# Patient Record
Sex: FEMALE | Race: WHITE | NOT HISPANIC OR LATINO | Employment: FULL TIME | URBAN - METROPOLITAN AREA
[De-identification: names, ages, dates, MRNs, and addresses within clinical notes are randomized per-mention and may not be internally consistent; named-entity substitution may affect disease eponyms.]

---

## 2020-10-12 ASSESSMENT — ANXIETY QUESTIONNAIRES
3. WORRYING TOO MUCH ABOUT DIFFERENT THINGS: SEVERAL DAYS
6. BECOMING EASILY ANNOYED OR IRRITABLE: NOT AT ALL
5. BEING SO RESTLESS THAT IT IS HARD TO SIT STILL: NOT AT ALL
4. TROUBLE RELAXING: SEVERAL DAYS
2. NOT BEING ABLE TO STOP OR CONTROL WORRYING: SEVERAL DAYS
7. FEELING AFRAID AS IF SOMETHING AWFUL MIGHT HAPPEN: NOT AT ALL
GAD7 TOTAL SCORE: 4
1. FEELING NERVOUS, ANXIOUS, OR ON EDGE: SEVERAL DAYS

## 2020-10-14 ASSESSMENT — ANXIETY QUESTIONNAIRES: GAD7 TOTAL SCORE: 4

## 2020-10-19 ASSESSMENT — ANXIETY QUESTIONNAIRES
4. TROUBLE RELAXING: SEVERAL DAYS
5. BEING SO RESTLESS THAT IT IS HARD TO SIT STILL: NOT AT ALL
6. BECOMING EASILY ANNOYED OR IRRITABLE: NOT AT ALL
GAD7 TOTAL SCORE: 4
1. FEELING NERVOUS, ANXIOUS, OR ON EDGE: SEVERAL DAYS
7. FEELING AFRAID AS IF SOMETHING AWFUL MIGHT HAPPEN: NOT AT ALL
2. NOT BEING ABLE TO STOP OR CONTROL WORRYING: SEVERAL DAYS
GAD7 TOTAL SCORE: 4
3. WORRYING TOO MUCH ABOUT DIFFERENT THINGS: SEVERAL DAYS
7. FEELING AFRAID AS IF SOMETHING AWFUL MIGHT HAPPEN: NOT AT ALL

## 2020-10-19 ASSESSMENT — ENCOUNTER SYMPTOMS
DEPRESSION: 0
DECREASED CONCENTRATION: 0
NERVOUS/ANXIOUS: 1
PANIC: 0
DECREASED LIBIDO: 0
INSOMNIA: 0
HOT FLASHES: 0

## 2020-10-20 ENCOUNTER — OFFICE VISIT (OUTPATIENT)
Dept: OBGYN | Facility: CLINIC | Age: 20
End: 2020-10-20
Attending: NURSE PRACTITIONER
Payer: COMMERCIAL

## 2020-10-20 VITALS
HEART RATE: 87 BPM | SYSTOLIC BLOOD PRESSURE: 111 MMHG | DIASTOLIC BLOOD PRESSURE: 76 MMHG | WEIGHT: 174.4 LBS | BODY MASS INDEX: 29.78 KG/M2 | HEIGHT: 64 IN

## 2020-10-20 DIAGNOSIS — Z30.09 ENCOUNTER FOR OTHER GENERAL COUNSELING OR ADVICE ON CONTRACEPTION: ICD-10-CM

## 2020-10-20 DIAGNOSIS — Z01.419 ENCOUNTER FOR GYNECOLOGICAL EXAMINATION WITHOUT ABNORMAL FINDING: ICD-10-CM

## 2020-10-20 DIAGNOSIS — Z00.00 VISIT FOR PREVENTIVE HEALTH EXAMINATION: Primary | ICD-10-CM

## 2020-10-20 DIAGNOSIS — Z11.3 SCREEN FOR STD (SEXUALLY TRANSMITTED DISEASE): ICD-10-CM

## 2020-10-20 DIAGNOSIS — F41.1 GENERALIZED ANXIETY DISORDER: ICD-10-CM

## 2020-10-20 DIAGNOSIS — Z12.4 SCREENING FOR MALIGNANT NEOPLASM OF CERVIX: ICD-10-CM

## 2020-10-20 DIAGNOSIS — Z72.51 UNPROTECTED SEXUAL INTERCOURSE: ICD-10-CM

## 2020-10-20 LAB
HCG UR QL: NEGATIVE
HIV 1+2 AB+HIV1 P24 AG SERPL QL IA: NONREACTIVE
INTERNAL QC OK POCT: YES

## 2020-10-20 PROCEDURE — G0463 HOSPITAL OUTPT CLINIC VISIT: HCPCS | Mod: 25

## 2020-10-20 PROCEDURE — 86780 TREPONEMA PALLIDUM: CPT | Performed by: NURSE PRACTITIONER

## 2020-10-20 PROCEDURE — 87491 CHLMYD TRACH DNA AMP PROBE: CPT | Performed by: NURSE PRACTITIONER

## 2020-10-20 PROCEDURE — 99385 PREV VISIT NEW AGE 18-39: CPT | Performed by: NURSE PRACTITIONER

## 2020-10-20 PROCEDURE — 36415 COLL VENOUS BLD VENIPUNCTURE: CPT | Performed by: NURSE PRACTITIONER

## 2020-10-20 PROCEDURE — 87591 N.GONORRHOEAE DNA AMP PROB: CPT | Performed by: NURSE PRACTITIONER

## 2020-10-20 PROCEDURE — 87389 HIV-1 AG W/HIV-1&-2 AB AG IA: CPT | Performed by: NURSE PRACTITIONER

## 2020-10-20 PROCEDURE — 81025 URINE PREGNANCY TEST: CPT | Performed by: NURSE PRACTITIONER

## 2020-10-20 PROCEDURE — G0145 SCR C/V CYTO,THINLAYER,RESCR: HCPCS | Performed by: NURSE PRACTITIONER

## 2020-10-20 RX ORDER — ESCITALOPRAM OXALATE 20 MG/1
20 TABLET ORAL DAILY
Qty: 60 TABLET | Refills: 4 | Status: SHIPPED | OUTPATIENT
Start: 2020-10-20 | End: 2021-08-02

## 2020-10-20 RX ORDER — ESCITALOPRAM OXALATE 20 MG/1
TABLET ORAL
COMMUNITY
Start: 2020-07-22 | End: 2020-10-20

## 2020-10-20 ASSESSMENT — ANXIETY QUESTIONNAIRES
1. FEELING NERVOUS, ANXIOUS, OR ON EDGE: SEVERAL DAYS
3. WORRYING TOO MUCH ABOUT DIFFERENT THINGS: SEVERAL DAYS
7. FEELING AFRAID AS IF SOMETHING AWFUL MIGHT HAPPEN: NOT AT ALL
2. NOT BEING ABLE TO STOP OR CONTROL WORRYING: SEVERAL DAYS
5. BEING SO RESTLESS THAT IT IS HARD TO SIT STILL: NOT AT ALL
6. BECOMING EASILY ANNOYED OR IRRITABLE: NOT AT ALL
GAD7 TOTAL SCORE: 4

## 2020-10-20 ASSESSMENT — PATIENT HEALTH QUESTIONNAIRE - PHQ9: 5. POOR APPETITE OR OVEREATING: SEVERAL DAYS

## 2020-10-20 ASSESSMENT — MIFFLIN-ST. JEOR: SCORE: 1536.07

## 2020-10-20 NOTE — LETTER
10/20/2020       RE: Elena Gunter  [address]  [facility] [address]     Dear Colleague,    Thank you for referring your patient, Elena Gunter, to the [facility] [place] [facility]. Please see a copy of my visit note below.      Progress Note    SUBJECTIVE:  Elena Gunter is a 20 year old, , who requests an Annual Preventive Exam.     Concerns today include:     1. Contraception: Patient took plan B one month ago and has not had her regular menses since. She was supposed to get her period the last week of September. Reports that she did have an episode of very heavy bleeding about 1.5-2 weeks after taking Plan B. Patient is interested in starting birth control today. Currently uses condoms. Has never been on birth control previously.      2. Desires STI testing today- has never been tested. Has had 1 partner in the last 3 months, 2 in last year.     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 Lives in an apartment with several roommates.     Menstrual History: period comes roughly every 28-30, lasts about 4-5 days with moderate bleeding and mild cramping.   Menstrual History 10/20/2020 10/20/2020   LAST MENSTRUAL PERIOD 2020 -   Menarche Age - 7   Period Cycle (Days) - 28   Period Duration (Days) - 4-5   Period Pattern - Regular   Menstrual Flow - Moderate   Menstrual Control - Maxi pad;Tampon   Dysmenorrhea - Moderate   PMS Symptoms - Cramping   Reviewed Today - Yes       First pap smear due today.    Diet: vegetarian. Gets adequate protein, fruits and veggies daily. Gets good dairy/calcium intake as well.     Exercise: 1-2x per week walking/running.     Mental health: taking lexapro since July and liking this method. Reports good results and feeling well since starting. No concerns for side effects reported.     HISTORY:  No current outpatient medications on file prior  to visit.  No current facility-administered medications on file prior to visit.     No Known Allergies    There is no immunization history on file for this patient.  Patient reports receiving the HPV vaccine series previously. Has already received the flu vaccine this year.     OB History    Para Term  AB Living   0 0 0 0 0 0   SAB TAB Ectopic Multiple Live Births   0 0 0 0 0     Past Medical History:   Diagnosis Date     Anxiety      Depression      Past Surgical History:   Procedure Laterality Date     wisdom teeth       Family History   Problem Relation Age of Onset     Anxiety Disorder Mother      Mental Illness Mother      Depression Mother      Social History     Socioeconomic History     Marital status: Single     Spouse name: None     Number of children: None     Years of education: None     Highest education level: None   Occupational History     None   Social Needs     Financial resource strain: None     Food insecurity     Worry: None     Inability: None     Transportation needs     Medical: None     Non-medical: None   Tobacco Use     Smoking status: Never Smoker     Smokeless tobacco: Never Used   Substance and Sexual Activity     Alcohol use: Yes     Comment: Very little     Drug use: Never     Sexual activity: Yes     Partners: Male     Birth control/protection: None   Lifestyle     Physical activity     Days per week: None     Minutes per session: None     Stress: None   Relationships     Social connections     Talks on phone: None     Gets together: None     Attends Adventist service: None     Active member of club or organization: None     Attends meetings of clubs or organizations: None     Relationship status: None     Intimate partner violence     Fear of current or ex partner: None     Emotionally abused: None     Physically abused: None     Forced sexual activity: None   Other Topics Concern     None   Social History Narrative     None       ROS   ROS: 10 point ROS neg other than  "the symptoms noted above in the HPI.  PHQ-2 Score:     PHQ-2 ( 1999 Pfizer) 10/20/2020   Q1: Little interest or pleasure in doing things 0   Q2: Feeling down, depressed or hopeless 0   PHQ-2 Score 0       MERCEDES-7 SCORE 10/12/2020 10/19/2020 10/20/2020   Total Score 4 (minimal anxiety) 4 (minimal anxiety) -   Total Score 4 4 4         EXAM:  Blood pressure 111/76, pulse 87, height 1.626 m (5' 4\"), weight 79.1 kg (174 lb 6.4 oz), last menstrual period 09/01/2020, not currently breastfeeding. Body mass index is 29.94 kg/m .  General - pleasant female in no acute distress.  Skin - no suspicious lesions or rashes  EENT-  euthyroid with out palpable nodules  Neck - supple without lymphadenopathy.  Lungs - clear to auscultation bilaterally.  Heart - regular rate and rhythm without murmur.  Abdomen - soft, nontender, nondistended, no masses or organomegaly noted.  Musculoskeletal - no gross deformities.  Neurological - normal strength, sensation, and mental status.    Breast Exam:  Breast: Without visible skin changes. No dimpling or lesions seen.   Breasts supple, non-tender with palpation, no dominant mass, nodularity, or nipple discharge noted bilaterally. Axillary nodes negative.      Pelvic Exam:  EG/BUS: Normal genital architecture without lesions, erythema or abnormal secretions Bartholin's, Urethra, [place]'s normal   Urethral meatus: normal   Urethra: no masses, tenderness, or scarring   Bladder: no masses or tenderness   Vagina: moist, pink, rugae with creamy, white, odorless and physiologic discharge  secretions  Cervix: Nulliparous, no lesions and pink, moist, closed, without lesion or CMT  Uterus: anteverted,   Adnexa: Within normal limits and No masses, nodularity, tenderness  Rectum: anus normal     UPT: negative    ASSESSMENT:  Encounter Diagnoses   Name Primary?     Screen for STD (sexually transmitted disease)      Unprotected sexual intercourse      Generalized anxiety disorder      Visit for preventive " health examination Yes     Screening for malignant neoplasm of cervix      Encounter for gynecological examination without abnormal finding      Encounter for other general counseling or advice on contraception         PLAN:   Orders Placed This Encounter   Procedures     Pelvic and Breast Exam Procedure []     Pap Smear Exam [] Do Not Remove     HIV Antigen Antibody Combo     Treponema Abs w Reflex to RPR and Titer     Pap imaged thin layer screen only - recommended age 21 - 24 years     hCG qual urine POCT     Refill provided for Lexapro.   STD testing completed today  First pap smear completed today   Counseled on contraceptive options. Pt desires to have an IUD placed. Counseling done on type sof IUDs, efficacy, common bleeding patterns, and side effects. Counseled on importance of consistent condom use for the 2 weeks before IUD placement. Take 600mg Ibuprofen 30 minutes prior to procedure. Pt will schedule IUD placement at her convenience.   Additional teaching done at this visit regarding calcium (1200 mg per day), self breast exam, exercise, birth control, mental health and weight/diet.    Return to clinic in one year.  Follow-up as needed.  Syl Rankin, DNP, APRN, WHNP

## 2020-10-20 NOTE — PATIENT INSTRUCTIONS

## 2020-10-20 NOTE — PROGRESS NOTES
Progress Note    SUBJECTIVE:  Elena Gunter is a 20 year old, , who requests an Annual Preventive Exam.     Concerns today include:     1. Contraception: Patient took plan B one month ago and has not had her regular menses since. She was supposed to get her period the last week of September. Reports that she did have an episode of very heavy bleeding about 1.5-2 weeks after taking Plan B. Patient is interested in starting birth control today. Currently uses condoms. Has never been on birth control previously.      2. Desires STI testing today- has never been tested. Has had 1 partner in the last 3 months, 2 in last year.     Social History: She is here from [place] studying at the [employer] in [employer] school for human rights. Lives in an apartment with several roommates.     Menstrual History: period comes roughly every 28-30, lasts about 4-5 days with moderate bleeding and mild cramping.   Menstrual History 10/20/2020 10/20/2020   LAST MENSTRUAL PERIOD 2020 -   Menarche Age - 7   Period Cycle (Days) - 28   Period Duration (Days) - 4-5   Period Pattern - Regular   Menstrual Flow - Moderate   Menstrual Control - Maxi pad;Tampon   Dysmenorrhea - Moderate   PMS Symptoms - Cramping   Reviewed Today - Yes       First pap smear due today.    Diet: vegetarian. Gets adequate protein, fruits and veggies daily. Gets good dairy/calcium intake as well.     Exercise: 1-2x per week walking/running.     Mental health: taking lexapro since July and liking this method. Reports good results and feeling well since starting. No concerns for side effects reported.     HISTORY:  No current outpatient medications on file prior to visit.  No current facility-administered medications on file prior to visit.     No Known Allergies    There is no immunization history on file for this patient.  Patient reports receiving the HPV vaccine series previously. Has already received the flu vaccine this year.     OB History    Para Term   AB Living   0 0 0 0 0 0   SAB TAB Ectopic Multiple Live Births   0 0 0 0 0     Past Medical History:   Diagnosis Date     Anxiety      Depression      Past Surgical History:   Procedure Laterality Date     wisdom teeth       Family History   Problem Relation Age of Onset     Anxiety Disorder Mother      Mental Illness Mother      Depression Mother      Social History     Socioeconomic History     Marital status: Single     Spouse name: None     Number of children: None     Years of education: None     Highest education level: None   Occupational History     None   Social Needs     Financial resource strain: None     Food insecurity     Worry: None     Inability: None     Transportation needs     Medical: None     Non-medical: None   Tobacco Use     Smoking status: Never Smoker     Smokeless tobacco: Never Used   Substance and Sexual Activity     Alcohol use: Yes     Comment: Very little     Drug use: Never     Sexual activity: Yes     Partners: Male     Birth control/protection: None   Lifestyle     Physical activity     Days per week: None     Minutes per session: None     Stress: None   Relationships     Social connections     Talks on phone: None     Gets together: None     Attends Jewish service: None     Active member of club or organization: None     Attends meetings of clubs or organizations: None     Relationship status: None     Intimate partner violence     Fear of current or ex partner: None     Emotionally abused: None     Physically abused: None     Forced sexual activity: None   Other Topics Concern     None   Social History Narrative     None       ROS   ROS: 10 point ROS neg other than the symptoms noted above in the HPI.  PHQ-2 Score:     PHQ-2 (  Pfizer) 10/20/2020   Q1: Little interest or pleasure in doing things 0   Q2: Feeling down, depressed or hopeless 0   PHQ-2 Score 0       MERCEDES-7 SCORE 10/12/2020 10/19/2020 10/20/2020   Total Score 4 (minimal anxiety) 4 (minimal anxiety) -  "  Total Score 4 4 4         EXAM:  Blood pressure 111/76, pulse 87, height 1.626 m (5' 4\"), weight 79.1 kg (174 lb 6.4 oz), last menstrual period 09/01/2020, not currently breastfeeding. Body mass index is 29.94 kg/m .  General - pleasant female in no acute distress.  Skin - no suspicious lesions or rashes  EENT-  euthyroid with out palpable nodules  Neck - supple without lymphadenopathy.  Lungs - clear to auscultation bilaterally.  Heart - regular rate and rhythm without murmur.  Abdomen - soft, nontender, nondistended, no masses or organomegaly noted.  Musculoskeletal - no gross deformities.  Neurological - normal strength, sensation, and mental status.    Breast Exam:  Breast: Without visible skin changes. No dimpling or lesions seen.   Breasts supple, non-tender with palpation, no dominant mass, nodularity, or nipple discharge noted bilaterally. Axillary nodes negative.      Pelvic Exam:  EG/BUS: Normal genital architecture without lesions, erythema or abnormal secretions Bartholin's, Urethra, [place]'s normal   Urethral meatus: normal   Urethra: no masses, tenderness, or scarring   Bladder: no masses or tenderness   Vagina: moist, pink, rugae with creamy, white, odorless and physiologic discharge  secretions  Cervix: Nulliparous, no lesions and pink, moist, closed, without lesion or CMT  Uterus: anteverted,   Adnexa: Within normal limits and No masses, nodularity, tenderness  Rectum: anus normal     UPT: negative    ASSESSMENT:  Encounter Diagnoses   Name Primary?     Screen for STD (sexually transmitted disease)      Unprotected sexual intercourse      Generalized anxiety disorder      Visit for preventive health examination Yes     Screening for malignant neoplasm of cervix      Encounter for gynecological examination without abnormal finding      Encounter for other general counseling or advice on contraception         PLAN:   Orders Placed This Encounter   Procedures     Pelvic and Breast Exam Procedure " []     Pap Smear Exam [] Do Not Remove     HIV Antigen Antibody Combo     Treponema Abs w Reflex to RPR and Titer     Pap imaged thin layer screen only - recommended age 21 - 24 years     hCG qual urine POCT     Refill provided for Lexapro.   STD testing completed today  First pap smear completed today   Counseled on contraceptive options. Pt desires to have an IUD placed. Counseling done on type sof IUDs, efficacy, common bleeding patterns, and side effects. Counseled on importance of consistent condom use for the 2 weeks before IUD placement. Take 600mg Ibuprofen 30 minutes prior to procedure. Pt will schedule IUD placement at her convenience.   Additional teaching done at this visit regarding calcium (1200 mg per day), self breast exam, exercise, birth control, mental health and weight/diet.    Return to clinic in one year.  Follow-up as needed.  Syl Rankin, DNP, APRN, WHNP

## 2020-10-21 LAB
C TRACH DNA SPEC QL NAA+PROBE: NEGATIVE
N GONORRHOEA DNA SPEC QL NAA+PROBE: NEGATIVE
SPECIMEN SOURCE: NORMAL
SPECIMEN SOURCE: NORMAL
T PALLIDUM AB SER QL: NONREACTIVE

## 2020-10-22 LAB
COPATH REPORT: NORMAL
PAP: NORMAL

## 2021-01-09 ENCOUNTER — HEALTH MAINTENANCE LETTER (OUTPATIENT)
Age: 21
End: 2021-01-09

## 2021-02-24 ENCOUNTER — VIRTUAL VISIT (OUTPATIENT)
Dept: FAMILY MEDICINE | Facility: CLINIC | Age: 21
End: 2021-02-24
Payer: COMMERCIAL

## 2021-02-24 DIAGNOSIS — B34.9 VIRAL SYNDROME: Primary | ICD-10-CM

## 2021-02-24 PROCEDURE — 99202 OFFICE O/P NEW SF 15 MIN: CPT | Mod: 95 | Performed by: PHYSICIAN ASSISTANT

## 2021-02-24 NOTE — PROGRESS NOTES
"Elena is a 20 year old who is being evaluated via a billable video visit.      How would you like to obtain your AVS? MyChart  If the video visit is dropped, the invitation should be resent by: Text to cell phone: [phone]  Will anyone else be joining your video visit? No    Video Start Time: 1:15 PM    Assessment & Plan       ICD-10-CM    1. Viral syndrome  B34.9                   BMI:   Estimated body mass index is 29.94 kg/m  as calculated from the following:    Height as of 10/20/20: 1.626 m (5' 4\").    Weight as of 10/20/20: 79.1 kg (174 lb 6.4 oz).       Patient Instructions   I would recommend telling your family to reschedule their flight until at least 10 days after your symptoms began, as long as your improving and fever free without fever reducing medication.   You may still have a variant of COVID that is not picked up by our current COVID tests  Return to clinic for any new or worsening symptoms or go to ER Urgent care in off hours    Discharge Instructions for COVID-19 Patients  You have--or may have--COVID-19. Please follow the instructions listed below.   If you have a weakened immune system, discuss with your doctor any other actions you need to take.  How can I protect others?  If you have symptoms (fever, cough, body aches or trouble breathing):    Stay home and away from others (self-isolate) until:  ? Your other symptoms have resolved (gotten better). And   ? You've had no fever--and no medicine that reduces fever--for 1 full day (24 hours). And   ? At least 10 days have passed since your symptoms started. (You may need to wait 20 days. Follow the advice of your care team.)  If you don't show symptoms, but testing showed that you have COVID-19:    Stay home and away from others (self-isolate) until at least 10 days have passed since the date of your first positive COVID-19 test.  During this time    Stay in your own room, even for meals. Use your own bathroom if you can.    Stay away from " "others in your home. No hugging, kissing or shaking hands. No visitors.    Don't go to work, school or anywhere else.    Clean \"high touch\" surfaces often (doorknobs, counters, handles). Use household cleaning spray or wipes.    You'll find a full list of  on the EPA website: www.epa.gov/pesticide-registration/list-n-disinfectants-use-against-sars-cov-2.    Cover your mouth and nose with a mask or other face covering to avoid spreading germs.    Wash your hands and face often. Use soap and water.    Caregivers in these groups are at risk for severe illness due to COVID-19:  ? People 65 years and older  ? People who live in a nursing home or long-term care facility  ? People with chronic disease (lung, heart, cancer, diabetes, kidney, liver, immunologic)  ? People who have a weakened immune system, including those who:    Are in cancer treatment    Take medicine that weakens the immune system, such as corticosteroids    Had a bone marrow or organ transplant    Have an immune deficiency    Have poorly controlled HIV or AIDS    Are obese (body mass index of 40 or higher)    Smoke regularly    Caregivers should wear gloves while washing dishes, handling laundry and cleaning bedrooms and bathrooms.    Use caution when washing and drying laundry: Don't shake dirty laundry and use the warmest water setting that you can.    For more tips on managing your health at home, go to www.cdc.gov/coronavirus/2019-ncov/downloads/10Things.pdf.  How can I take care of myself at home?  1. Get lots of rest. Drink extra fluids (unless a doctor has told you not to).  2. Take Tylenol (acetaminophen) for fever or pain. If you have liver or kidney problems, ask your family doctor if it's okay to take Tylenol.   Adults can take either:   ? 650 mg (two 325 mg pills) every 4 to 6 hours, or   ? 1,000 mg (two 500 mg pills) every 8 hours as needed.  ? Note: Don't take more than 3,000 mg in one day. Acetaminophen is found in many medicines " (both prescribed and over-the-counter medicines). Read all labels to be sure you don't take too much.   For children, check the Tylenol bottle for the right dose. The dose is based on the child's age or weight.  3. If you have other health problems (like cancer, heart failure, an organ transplant or severe kidney disease): Call your specialty clinic if you don't feel better in the next 2 days.  4. Know when to call 911. Emergency warning signs include:  ? Trouble breathing or shortness of breath  ? Pain or pressure in the chest that doesn't go away  ? Feeling confused like you haven't felt before, or not being able to wake up  ? Bluish-colored lips or face  5. Your doctor may have prescribed a blood thinner medicine. Follow their instructions.  Where can I get more information?    [facility] - About COVID-19:   [email or web address]    CDC - What to Do If You're Sick: www.cdc.gov/coronavirus/2019-ncov/about/steps-when-sick.html    CDC - Ending Home Isolation: www.cdc.gov/coronavirus/2019-ncov/hcp/disposition-in-home-patients.html    CDC - Caring for Someone: www.cdc.gov/coronavirus/2019-ncov/if-you-are-sick/care-for-someone.html    [facility] - Interim Guidance for Hospital Discharge to Home: www.health.[facility]/diseases/coronavirus/hcp/hospdischarge.pdf    Below are the COVID-19 hotlines at the [place] Department of Health ([facility]). Interpreters are available.  ? For health questions: Call 006-871-0730 or 1-234.552.1819 (7 a.m. to 7 p.m.)  ? For questions about schools and childcare: Call 906-258-6463 or 1-599.227.4159 (7 a.m. to 7 p.m.)    For informational purposes only. Not to replace the advice of your health care provider. Clinically reviewed by Dr. Kb Llanes.   Copyright   2020 [employer]. All rights reserved. [employer] [id number] - REV 01/05/21.          No follow-ups on file.    [facility]    Dolly   Elena is a 20  year old who presents for the following health issues     HPI       Acute Illness  Acute illness concerns: Cough, fever-COVID test negative  Onset/Duration: 3 days  Symptoms:  Fever: YES-last day yesterday  Chills/Sweats: YES-last day yesterday  Headache (location?): no  Sinus Pressure: YES  Conjunctivitis:  no  Ear Pain: no  Rhinorrhea: YES  Congestion: YES  Sore Throat: YES  Cough: YES-productive of clear sputum, worsening over time  Wheeze: no  Decreased Appetite: no  Nausea: no  Vomiting: no  Diarrhea: no  Dysuria/Freq.: no  Dysuria or Hematuria: no  Fatigue/Achiness: YES  Sick/Strep Exposure: no  Therapies tried and outcome: Advil, mucinex-helped    3 days ago felt a sore throat  The next day she felt more under the weather and got a COVID test  Developed a cough and a fever up to 103  Now down to 98 degrees  Still has a cough and sore throat, runny nose    Review of Systems   INTEGUMENTARY/SKIN: NEGATIVE for worrisome rashes, moles or lesions  EYES: NEGATIVE for vision changes or irritation  ENT/MOUTH: NEGATIVE for ear pain , rhinorrhea-purulent and sinus pressure  RESP:NEGATIVE for cough-productive, pleurisy and wheezing  CV: NEGATIVE for chest pain, palpitations or peripheral edema  GI: NEGATIVE for nausea, abdominal pain, heartburn, or change in bowel habits  : NEGATIVE for frequency, dysuria, or hematuria  MUSCULOSKELETAL: NEGATIVE for significant arthralgias or myalgia  NEURO: NEGATIVE for weakness, dizziness or paresthesias  ENDOCRINE: NEGATIVE for temperature intolerance, skin/hair changes  HEME: NEGATIVE for bleeding problems  PSYCHIATRIC: NEGATIVE for changes in mood or affect      Objective           Vitals:  No vitals were obtained today due to virtual visit.    Physical Exam   GENERAL: Healthy, alert and no distress  EYES: Eyes grossly normal to inspection.  No discharge or erythema, or obvious scleral/conjunctival abnormalities.  RESP: No audible wheeze, cough, or visible cyanosis.  No visible  retractions or increased work of breathing.    SKIN: Visible skin clear. No significant rash, abnormal pigmentation or lesions.  NEURO: Cranial nerves grossly intact.  Mentation and speech appropriate for age.  PSYCH: Mentation appears normal, affect normal/bright, judgement and insight intact, normal speech and appearance well-groomed.                Video-Visit Details    Type of service:  Video Visit    Video End Time:1:25 PM    Originating Location (pt. Location): Home    Distant Location (provider location):  [facility]     Platform used for Video Visit: Karoline

## 2021-02-24 NOTE — PATIENT INSTRUCTIONS
"I would recommend telling your family to reschedule their flight until at least 10 days after your symptoms began, as long as your improving and fever free without fever reducing medication.   You may still have a variant of COVID that is not picked up by our current COVID tests  Return to clinic for any new or worsening symptoms or go to ER Urgent care in off hours    Discharge Instructions for COVID-19 Patients  You have--or may have--COVID-19. Please follow the instructions listed below.   If you have a weakened immune system, discuss with your doctor any other actions you need to take.  How can I protect others?  If you have symptoms (fever, cough, body aches or trouble breathing):    Stay home and away from others (self-isolate) until:  ? Your other symptoms have resolved (gotten better). And   ? You've had no fever--and no medicine that reduces fever--for 1 full day (24 hours). And   ? At least 10 days have passed since your symptoms started. (You may need to wait 20 days. Follow the advice of your care team.)  If you don't show symptoms, but testing showed that you have COVID-19:    Stay home and away from others (self-isolate) until at least 10 days have passed since the date of your first positive COVID-19 test.  During this time    Stay in your own room, even for meals. Use your own bathroom if you can.    Stay away from others in your home. No hugging, kissing or shaking hands. No visitors.    Don't go to work, school or anywhere else.    Clean \"high touch\" surfaces often (doorknobs, counters, handles). Use household cleaning spray or wipes.    You'll find a full list of  on the EPA website: www.epa.gov/pesticide-registration/list-n-disinfectants-use-against-sars-cov-2.    Cover your mouth and nose with a mask or other face covering to avoid spreading germs.    Wash your hands and face often. Use soap and water.    Caregivers in these groups are at risk for severe illness due to " COVID-19:  ? People 65 years and older  ? People who live in a nursing home or long-term care facility  ? People with chronic disease (lung, heart, cancer, diabetes, kidney, liver, immunologic)  ? People who have a weakened immune system, including those who:    Are in cancer treatment    Take medicine that weakens the immune system, such as corticosteroids    Had a bone marrow or organ transplant    Have an immune deficiency    Have poorly controlled HIV or AIDS    Are obese (body mass index of 40 or higher)    Smoke regularly    Caregivers should wear gloves while washing dishes, handling laundry and cleaning bedrooms and bathrooms.    Use caution when washing and drying laundry: Don't shake dirty laundry and use the warmest water setting that you can.    For more tips on managing your health at home, go to www.cdc.gov/coronavirus/2019-ncov/downloads/10Things.pdf.  How can I take care of myself at home?  1. Get lots of rest. Drink extra fluids (unless a doctor has told you not to).  2. Take Tylenol (acetaminophen) for fever or pain. If you have liver or kidney problems, ask your family doctor if it's okay to take Tylenol.   Adults can take either:   ? 650 mg (two 325 mg pills) every 4 to 6 hours, or   ? 1,000 mg (two 500 mg pills) every 8 hours as needed.  ? Note: Don't take more than 3,000 mg in one day. Acetaminophen is found in many medicines (both prescribed and over-the-counter medicines). Read all labels to be sure you don't take too much.   For children, check the Tylenol bottle for the right dose. The dose is based on the child's age or weight.  3. If you have other health problems (like cancer, heart failure, an organ transplant or severe kidney disease): Call your specialty clinic if you don't feel better in the next 2 days.  4. Know when to call 911. Emergency warning signs include:  ? Trouble breathing or shortness of breath  ? Pain or pressure in the chest that doesn't go away  ? Feeling confused like  you haven't felt before, or not being able to wake up  ? Bluish-colored lips or face  5. Your doctor may have prescribed a blood thinner medicine. Follow their instructions.  Where can I get more information?    [facility] - About COVID-19:   [email or web address]    CDC - What to Do If You're Sick: www.cdc.gov/coronavirus/2019-ncov/about/steps-when-sick.html    CDC - Ending Home Isolation: www.cdc.gov/coronavirus/2019-ncov/hcp/disposition-in-home-patients.html    [facility] - Caring for Someone: www.cdc.gov/coronavirus/2019-ncov/if-you-are-sick/care-for-someone.html    [facility] - Interim Guidance for Hospital Discharge to Home: www.health.[facility].[place]/diseases/coronavirus/hcp/hospdischarge.pdf    Below are the COVID-19 hotlines at the [place] Department of Health ([facility]). Interpreters are available.  ? For health questions: Call 988-975-4467 or 1-338.898.6157 (7 a.m. to 7 p.m.)  ? For questions about schools and childcare: Call 179-048-5337 or 1-928.553.5103 (7 a.m. to 7 p.m.)    For informational purposes only. Not to replace the advice of your health care provider. Clinically reviewed by Dr. Kb Llanes.   Copyright   2020 [facility]. All rights reserved. [employer] [id number] - REV 01/05/21.

## 2021-08-02 DIAGNOSIS — F41.1 GENERALIZED ANXIETY DISORDER: ICD-10-CM

## 2021-08-02 RX ORDER — ESCITALOPRAM OXALATE 20 MG/1
20 TABLET ORAL DAILY
Qty: 90 TABLET | Refills: 0 | Status: SHIPPED | OUTPATIENT
Start: 2021-08-02 | End: 2021-11-08

## 2021-08-02 NOTE — TELEPHONE ENCOUNTER
Refill request received for escitalopram. Last annual exam 10/2020. Short term refill sent. [facility] message sent to patient instructing her to schedule annual exam for further refills.

## 2021-09-03 ENCOUNTER — VIRTUAL VISIT (OUTPATIENT)
Dept: INTERNAL MEDICINE | Facility: CLINIC | Age: 21
End: 2021-09-03
Payer: COMMERCIAL

## 2021-09-03 DIAGNOSIS — F41.0 PANIC DISORDER WITHOUT AGORAPHOBIA: Primary | ICD-10-CM

## 2021-09-03 PROCEDURE — 99213 OFFICE O/P EST LOW 20 MIN: CPT | Mod: 95 | Performed by: INTERNAL MEDICINE

## 2021-09-03 RX ORDER — ESCITALOPRAM OXALATE 10 MG/1
10 TABLET ORAL DAILY
Qty: 90 TABLET | Refills: 3 | Status: SHIPPED | OUTPATIENT
Start: 2021-09-03 | End: 2021-09-13

## 2021-09-03 ASSESSMENT — ANXIETY QUESTIONNAIRES
GAD7 TOTAL SCORE: 6
5. BEING SO RESTLESS THAT IT IS HARD TO SIT STILL: SEVERAL DAYS
GAD7 TOTAL SCORE: 6
3. WORRYING TOO MUCH ABOUT DIFFERENT THINGS: SEVERAL DAYS
6. BECOMING EASILY ANNOYED OR IRRITABLE: SEVERAL DAYS
1. FEELING NERVOUS, ANXIOUS, OR ON EDGE: SEVERAL DAYS
2. NOT BEING ABLE TO STOP OR CONTROL WORRYING: SEVERAL DAYS
4. TROUBLE RELAXING: SEVERAL DAYS
8. IF YOU CHECKED OFF ANY PROBLEMS, HOW DIFFICULT HAVE THESE MADE IT FOR YOU TO DO YOUR WORK, TAKE CARE OF THINGS AT HOME, OR GET ALONG WITH OTHER PEOPLE?: SOMEWHAT DIFFICULT
7. FEELING AFRAID AS IF SOMETHING AWFUL MIGHT HAPPEN: NOT AT ALL
7. FEELING AFRAID AS IF SOMETHING AWFUL MIGHT HAPPEN: NOT AT ALL
GAD7 TOTAL SCORE: 6

## 2021-09-03 NOTE — PROGRESS NOTES
"Elena is a 20 year old who is being evaluated via a billable video visit.      How would you like to obtain your AVS? HCDC  If the video visit is dropped, the invitation should be resent by: Text to cell phone: [phone]  Will anyone else be joining your video visit? No    Video Start Time: 3:33 PM    Assessment & Plan     Panic disorder without agoraphobia  I do believe she would not benefit from a change in SSRI or additional medication like BuSpar.  She does not want to do that at this stage would like to increase her existing citalopram.  I discouraged her to about 40 mg will increase by 10 mg to 30 mg did explain that that is typically the maximum dose for panic anxiety.  She will contact me via HCDC to see if this is helped.  She can add 10 mg to her existing 20 mg prescription  - escitalopram (LEXAPRO) 10 MG tablet  Dispense: 90 tablet; Refill: 3               BMI:   Estimated body mass index is 29.94 kg/m  as calculated from the following:    Height as of 10/20/20: 1.626 m (5' 4\").    Weight as of 10/20/20: 79.1 kg (174 lb 6.4 oz).           No follow-ups on file.    Justine Valdivia MD  [facility]    Subjective   Elena is a 20 year old who presents for the following health issues   Has been on lexapro for a year . Doesn't have a psychaitrist in [facility] . Was prescribed escitalopram 10 mg by a psychiatrist in [place] that was recently increased to 20 mg.  Formal diagnosis of major depressive disorder was made in July 2020.  She also has panic symptoms and had a panic attack 2 weeks ago.  She has no suicidal ideation or psychomotor symptoms does not use any recreational substances and is a non-smoker and social drinker of alcohol   She does have a therapist she is a  and temporarily till May she has some friends here but no family.  She lives alone    Non panic attack   Here fro graduate school . Yes friends   No family     HPI     Pt want to increase " the dose on escitalopram 20 mg.        Review of Systems   Constitutional, HEENT, cardiovascular, pulmonary, gi and gu systems are negative, except as otherwise noted.      Objective           Vitals:  No vitals were obtained today due to virtual visit.    Physical Exam   GENERAL: Healthy, alert and no distress  EYES: Eyes grossly normal to inspection.  No discharge or erythema, or obvious scleral/conjunctival abnormalities.  RESP: No audible wheeze, cough, or visible cyanosis.  No visible retractions or increased work of breathing.    SKIN: Visible skin clear. No significant rash, abnormal pigmentation or lesions.  NEURO: Cranial nerves grossly intact.  Mentation and speech appropriate for age.  PSYCH: Mentation appears normal, affect normal/bright, judgement and insight intact, normal speech and appearance well-groomed.                Video-Visit Details    Type of service:  Video Visit    Video End Time:350 pm     Originating Location (pt. Location): Home    Distant Location (provider location):  [facility]     Platform used for Video Visit: [employer]  Answers for HPI/ROS submitted by the patient on 9/3/2021  MERCEDES 7 TOTAL SCORE: 6

## 2021-09-04 ASSESSMENT — ANXIETY QUESTIONNAIRES: GAD7 TOTAL SCORE: 6

## 2021-09-13 ENCOUNTER — OFFICE VISIT (OUTPATIENT)
Dept: OBGYN | Facility: CLINIC | Age: 21
End: 2021-09-13
Attending: ADVANCED PRACTICE MIDWIFE
Payer: COMMERCIAL

## 2021-09-13 ENCOUNTER — LAB (OUTPATIENT)
Dept: LAB | Facility: CLINIC | Age: 21
End: 2021-09-13
Attending: ADVANCED PRACTICE MIDWIFE
Payer: COMMERCIAL

## 2021-09-13 VITALS
SYSTOLIC BLOOD PRESSURE: 111 MMHG | DIASTOLIC BLOOD PRESSURE: 72 MMHG | WEIGHT: 187.4 LBS | HEART RATE: 83 BPM | BODY MASS INDEX: 32.17 KG/M2

## 2021-09-13 DIAGNOSIS — N76.0 VAGINITIS AND VULVOVAGINITIS: Primary | ICD-10-CM

## 2021-09-13 DIAGNOSIS — Z11.3 SCREEN FOR STD (SEXUALLY TRANSMITTED DISEASE): ICD-10-CM

## 2021-09-13 DIAGNOSIS — N76.0 VAGINITIS AND VULVOVAGINITIS: ICD-10-CM

## 2021-09-13 LAB
HBV SURFACE AG SERPL QL IA: NONREACTIVE
HCV AB SERPL QL IA: NONREACTIVE
HIV 1+2 AB+HIV1 P24 AG SERPL QL IA: NONREACTIVE
T PALLIDUM AB SER QL: NONREACTIVE

## 2021-09-13 PROCEDURE — 86780 TREPONEMA PALLIDUM: CPT

## 2021-09-13 PROCEDURE — 87591 N.GONORRHOEAE DNA AMP PROB: CPT | Performed by: ADVANCED PRACTICE MIDWIFE

## 2021-09-13 PROCEDURE — 86803 HEPATITIS C AB TEST: CPT

## 2021-09-13 PROCEDURE — G0463 HOSPITAL OUTPT CLINIC VISIT: HCPCS

## 2021-09-13 PROCEDURE — 87491 CHLMYD TRACH DNA AMP PROBE: CPT | Performed by: ADVANCED PRACTICE MIDWIFE

## 2021-09-13 PROCEDURE — 87389 HIV-1 AG W/HIV-1&-2 AB AG IA: CPT

## 2021-09-13 PROCEDURE — 87340 HEPATITIS B SURFACE AG IA: CPT

## 2021-09-13 PROCEDURE — 99214 OFFICE O/P EST MOD 30 MIN: CPT | Performed by: ADVANCED PRACTICE MIDWIFE

## 2021-09-13 PROCEDURE — 36415 COLL VENOUS BLD VENIPUNCTURE: CPT

## 2021-09-13 RX ORDER — TERCONAZOLE 80 MG/1
80 SUPPOSITORY VAGINAL AT BEDTIME
Qty: 7 SUPPOSITORY | Refills: 0 | Status: SHIPPED | OUTPATIENT
Start: 2021-09-13 | End: 2021-09-20

## 2021-09-13 RX ORDER — CLINDAMYCIN HCL 300 MG
300 CAPSULE ORAL 2 TIMES DAILY
Qty: 14 CAPSULE | Refills: 0 | Status: SHIPPED | OUTPATIENT
Start: 2021-09-13 | End: 2021-09-20

## 2021-09-13 NOTE — NURSING NOTE
Chief Complaint   Patient presents with     Consult     Pt has had abnormal discharge, sometimes painful feeling for a few months.

## 2021-09-13 NOTE — PROGRESS NOTES
SUBJECTIVE: Elena Gunter 20 year old female presents with abnormal vaginal/vulvar symptoms for 2 months.    Here w worry for vaginitis-maybe yeast-increased discharge, itchy, malodorous, copious, creamy and at times curdy. Has been bothering her for the last 2 months, unable to get care when she was in [place] and traveling this summer.  Very busy, demanding and overwhelming summer. 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    Uses condoms, wants STI screen today  Patient's last menstrual period was 08/24/2021.   Vaginal/vulvar symptoms: discharge described as copious, white, watery, frothy and curd-like, local irritation, vulvar itching, odor and burning.    Other associated symptoms: none.    Predisposing factors: multiple sexual partners    Hx of previous vaginitis: none    Sexually active: yes, multiple partners, contraception - condoms    @[facility]@    General medical, surgical, OB/Gyn and social histories   reviewed and updated in Histories section of [facility].     Review Of Systems  Skin: negative  Eyes: negative  Ears/Nose/Throat: negative  Respiratory: No shortness of breath, dyspnea on exertion, cough, or hemoptysis  Cardiovascular: negative  Gastrointestinal: negative    Musculoskeletal: negative  Neurologic: negative  Psychiatric: negative  Hematologic/Lymphatic/Immunologic: negative  Endocrine: negative    OBJECTIVE:  Patient appears well, vital signs normal.  /72   Pulse 83   Wt 85 kg (187 lb 6.4 oz)   LMP 08/24/2021   BMI 32.17 kg/m        ABDOMEN: Soft without masses or tenderness.  No CVA tenderness.   External Genitalia: WNL and Shaved    PELVIC EXAM:  Bartholin's/Urethral Meatus/[place]'s (BUS):  WNL/Negative  Bladder:  WNL  Vagina:  rugated,  tone:  good,  curdlike discharge, discharge:  Copious, frothy and odor  Cervix:  healthy, nulliparous  Anus/Perineum:  Intact and Normal    WET PREP: monilia and clue cells   CULTURES: GC and Chlamydia  genprobes and HIV antibody blood test.    ASSESSMENT:   yeast, bacterial vaginosis.  No diagnosis found.  (N76.0) Vaginitis and vulvovaginitis  (primary encounter diagnosis)    Plan: clindamycin (CLEOCIN) 300 MG capsule,         terconazole (TERAZOL 3) 80 MG vaginal         suppository, HIV Antigen Antibody Combo,         Neisseria gonorrhoeae PCR, Treponema Abs w         Reflex to [facility] and Titer, Hepatitis C antibody,         Chlamydia trachomatis PCR (Clinic Collect),         Hepatitis B surface antigen      (Z11.3) Screen for STD (sexually transmitted disease)    Plan: clindamycin (CLEOCIN) 300 MG capsule,         terconazole (TERAZOL 3) 80 MG vaginal         suppository, HIV Antigen Antibody Combo,         Neisseria gonorrhoeae PCR, Treponema Abs w         Reflex to [facility] and Titer, Hepatitis C antibody,         Chlamydia trachomatis PCR (Clinic Collect),         Hepatitis B surface antigen    PLAN:  Treatment plan per orders in [facility]. STD prevention discussed. Birth control needs reviewed.  Abstain from intercourse for duration of treatment. Return if   symptoms do not resolve as anticipated.  Manasa Phillips, DNP, APRN, CNM, FACNM

## 2021-09-13 NOTE — LETTER
9/13/2021       RE: Elena Gunter  [address]  [id number]  [facility] [address]     Dear Colleague,    Thank you for referring your patient, Elena Gunter, to the [facility] [place] [facility]. Please see a copy of my visit note below.    SUBJECTIVE: Elena Gunter 20 year old female presents with abnormal vaginal/vulvar symptoms for 2 months.    Here w worry for vaginitis-maybe yeast-increased discharge, itchy, malodorous, copious, creamy and at times curdy. Has been bothering her for the last 2 months, unable to get care when she was in [place] and traveling this summer.  Very busy, demanding and overwhelming summer. 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    Uses condoms, wants STI screen today  Patient's last menstrual period was 08/24/2021.   Vaginal/vulvar symptoms: discharge described as copious, white, watery, frothy and curd-like, local irritation, vulvar itching, odor and burning.    Other associated symptoms: none.    Predisposing factors: multiple sexual partners    Hx of previous vaginitis: none    Sexually active: yes, multiple partners, contraception - condoms    [email or web address]    General medical, surgical, OB/Gyn and social histories   reviewed and updated in Histories section of [facility].     Review Of Systems  Skin: negative  Eyes: negative  Ears/Nose/Throat: negative  Respiratory: No shortness of breath, dyspnea on exertion, cough, or hemoptysis  Cardiovascular: negative  Gastrointestinal: negative    Musculoskeletal: negative  Neurologic: negative  Psychiatric: negative  Hematologic/Lymphatic/Immunologic: negative  Endocrine: negative    OBJECTIVE:  Patient appears well, vital signs normal.  /72   Pulse 83   Wt 85 kg (187 lb 6.4 oz)   LMP 08/24/2021   BMI 32.17 kg/m        ABDOMEN: Soft without masses or tenderness.  No CVA tenderness.   External Genitalia: WNL and  Shaved    PELVIC EXAM:  Bartholin's/Urethral Meatus/[place]'s (BUS):  WNL/Negative  Bladder:  WNL  Vagina:  rugated,  tone:  good,  curdlike discharge, discharge:  Copious, frothy and odor  Cervix:  healthy, nulliparous  Anus/Perineum:  Intact and Normal    WET PREP: monilia and clue cells   CULTURES: GC and Chlamydia genprobes and HIV antibody blood test.    ASSESSMENT:   yeast, bacterial vaginosis.  No diagnosis found.  (N76.0) Vaginitis and vulvovaginitis  (primary encounter diagnosis)    Plan: clindamycin (CLEOCIN) 300 MG capsule,         terconazole (TERAZOL 3) 80 MG vaginal         suppository, HIV Antigen Antibody Combo,         Neisseria gonorrhoeae PCR, Treponema Abs w         Reflex to [facility] and Titer, Hepatitis C antibody,         Chlamydia trachomatis PCR (Clinic Collect),         Hepatitis B surface antigen      (Z11.3) Screen for STD (sexually transmitted disease)    Plan: clindamycin (CLEOCIN) 300 MG capsule,         terconazole (TERAZOL 3) 80 MG vaginal         suppository, HIV Antigen Antibody Combo,         Neisseria gonorrhoeae PCR, Treponema Abs w         Reflex to [facility] and Titer, Hepatitis C antibody,         Chlamydia trachomatis PCR (Clinic Collect),         Hepatitis B surface antigen    PLAN:  Treatment plan per orders in [facility]. STD prevention discussed. Birth control needs reviewed.  Abstain from intercourse for duration of treatment. Return if   symptoms do not resolve as anticipated.  Manasa Phillips, DNP, APRN, CNM, FACNM

## 2021-09-14 LAB
C TRACH DNA SPEC QL NAA+PROBE: NEGATIVE
N GONORRHOEA DNA SPEC QL NAA+PROBE: NEGATIVE

## 2021-10-11 ENCOUNTER — HEALTH MAINTENANCE LETTER (OUTPATIENT)
Age: 21
End: 2021-10-11

## 2021-11-08 DIAGNOSIS — F41.1 GENERALIZED ANXIETY DISORDER: ICD-10-CM

## 2021-11-08 RX ORDER — ESCITALOPRAM OXALATE 20 MG/1
20 TABLET ORAL DAILY
Qty: 90 TABLET | Refills: 0 | Status: SHIPPED | OUTPATIENT
Start: 2021-11-08 | End: 2022-02-15

## 2021-11-08 NOTE — TELEPHONE ENCOUNTER
Refill request received for escitalopram. Last annual exam 10/2020. Short term refill sent. [employer] message sent to patient instructing her to schedule annual exam.

## 2021-12-05 ENCOUNTER — HEALTH MAINTENANCE LETTER (OUTPATIENT)
Age: 21
End: 2021-12-05

## 2022-02-15 DIAGNOSIS — F41.1 GENERALIZED ANXIETY DISORDER: ICD-10-CM

## 2022-02-15 RX ORDER — ESCITALOPRAM OXALATE 20 MG/1
20 TABLET ORAL DAILY
Qty: 90 TABLET | Refills: 0 | Status: SHIPPED | OUTPATIENT
Start: 2022-02-15 | End: 2022-05-16

## 2022-02-15 NOTE — TELEPHONE ENCOUNTER
Received refill request for Lexapro. Patient has upcoming annual scheduled. Refill sent per protocol.

## 2022-02-17 PROBLEM — Z00.00 ENCOUNTER FOR PREVENTIVE HEALTH EXAMINATION: Status: ACTIVE | Noted: 2022-02-17

## 2022-02-17 NOTE — PROGRESS NOTES
Progress Note    SUBJECTIVE:  Elena Gunter is an 21 year old, , who requests an Annual Preventive Exam.     She is a former patient to the [facility].   LMP started 22 (today), 4-5 days, cycles are 28 days, regular. Moderate- heavy bleeding. Uses Diva cup during menstrual bleeding. Currently sexually active with 3 male partner within the past year. Declines STD screening today  Currently using condoms for birth control  Concerned about weight gain, mood changes with hormonal birth control options  Last screened for STDs  Negative GC/CT, no new partners since 2021    The patient reports that there is not domestic violence in her life.     Exercise: walking outside on campus  Stress reduction: therapy once a week    Elena has a history of depression and anxiety and is currently taking Lexapro 20mg daily. 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    Last pap 10/2020, due 10/2023    Flu vaccine December through school. Elena has received her COVID vaccine and booster    Concerns:  -Elena has noted abnormal vaginal discharge vaginal symptoms: watery, thin and malodorous. These symptoms developed two weeks ago. She was treated for yeast and BV 2021 and is concerned that one of these infections has returned. She shares that her stomach was upset with the oral Cleocin she took for the BV.    Shira is currently bleeding with her menses, declines pelvic exam or wet prep today      Menstrual History:  Menstrual History 2021   LAST MENSTRUAL PERIOD 2021 -   Menarche Age - - 8   Period Cycle (Days) - - 28   Period Duration (Days) - - 4-5 days   Method of Contraception - - Condoms   Period Pattern - - Regular   Menstrual Flow - - Heavy   Menstrual Control - - -   Dysmenorrhea - - Severe   PMS Symptoms - - Cramping   Reviewed Today - - Yes        Last    Lab Results   Component Value Date    PAP NIL 10/20/2020     History of abnormal Pap smear: NO - age 21-29 PAP every 3 years recommended    Mammogram current: not applicable      Last Colonoscopy:  No results found for this or any previous visit.      HISTORY:  escitalopram (LEXAPRO) 20 MG tablet, Take 1 tablet (20 mg) by mouth daily    No current facility-administered medications on file prior to visit.    No Known Allergies  Immunization History   Administered Date(s) Administered     COVID-19,PF,Pfizer (12+ Yrs) 2021, 2021     Influenza Vaccine IM > 6 months Valent IIV4 (Alfuria,Fluzone) 10/14/2020       OB History    Para Term  AB Living   0 0 0 0 0 0   SAB IAB Ectopic Multiple Live Births   0 0 0 0 0     Past Medical History:   Diagnosis Date     Anxiety      Depression      Depressive disorder      Past Surgical History:   Procedure Laterality Date     wisdom teeth       Family History   Problem Relation Age of Onset     Anxiety Disorder Mother      Mental Illness Mother      Depression Mother      Anxiety Disorder Sister      Depression Sister      Social History     Socioeconomic History     Marital status: Single     Spouse name: Not on file     Number of children: Not on file     Years of education: Not on file     Highest education level: Not on file   Occupational History     Not on file   Tobacco Use     Smoking status: Never Smoker     Smokeless tobacco: Never Used   Substance and Sexual Activity     Alcohol use: Yes     Comment: Very little     Drug use: Never     Sexual activity: Yes     Partners: Male     Birth control/protection: None, Condom   Other Topics Concern     Not on file   Social History Narrative     Not on file     Social Determinants of Health     Financial Resource Strain: Not on file   Food Insecurity: Not on file   Transportation Needs: Not on file   Physical Activity: Not on file   Stress: Not on file   Social Connections: Not on  file   Intimate Partner Violence: Not on file   Housing Stability: Not on file       Review of Systems     Constitutional:  Negative for fever, chills, weight loss, weight gain, fatigue, decreased appetite, night sweats, recent stressors, height gain, height loss, post-operative complications, incisional pain, hallucinations, increased energy, hyperactivity and confused.   HENT:  Negative for ear pain, hearing loss, tinnitus, nosebleeds, trouble swallowing, hoarse voice, mouth sores, sore throat, ear discharge, tooth pain, gum tenderness, taste disturbance, smell disturbance, hearing aid, bleeding gums, dry mouth, sinus pain, sinus congestion and neck mass.    Eyes:  Negative for double vision, pain, redness, eye pain, decreased vision, eye watering, eye bulging, eye dryness, flashing lights, spots, floaters, strabismus, tunnel vision, jaundice and eye irritation.   Respiratory:   Negative for cough, hemoptysis, sputum production, shortness of breath, wheezing, sleep disturbances due to breathing, snores loudly, respiratory pain, dyspnea on exertion, cough disturbing sleep and postural dyspnea.    Cardiovascular:  Negative for chest pain, dyspnea on exertion, palpitations, orthopnea, claudication, leg swelling, fingers/toes turn blue, hypertension, hypotension, syncope, history of heart murmur, chest pain on exertion, chest pain at rest, pacemaker, few scattered varicosities, leg pain, sleep disturbances due to breathing, tachycardia, light-headedness, exercise intolerance and edema.   Gastrointestinal:  Negative for heartburn, nausea, vomiting, abdominal pain, diarrhea, constipation, blood in stool, melena, rectal pain, bloating, hemorrhoids, bowel incontinence, jaundice, rectal bleeding, coffee ground emesis and change in stool.   Genitourinary:  Positive for vaginal discharge. Negative for bladder incontinence, dysuria, urgency, hematuria, flank pain, difficulty urinating, genital sores, dyspareunia, decreased  "libido, nocturia, voiding less frequently, arousal difficulty, abnormal vaginal bleeding, excessive menstruation, menstrual changes, hot flashes, vaginal dryness and postmenopausal bleeding.   Musculoskeletal:  Negative for myalgias, back pain, joint swelling, arthralgias, stiffness, muscle cramps, neck pain, bone pain, muscle weakness and fracture.   Skin:  Negative for nail changes, itching, poor wound healing, rash, hair changes, skin changes, acne, warts, poor wound healing, scarring, flaky skin, Raynaud's phenomenon, sensitivity to sunlight and skin thickening.   Neurological:  Negative for dizziness, tingling, tremors, speech change, seizures, loss of consciousness, weakness, light-headedness, numbness, headaches, disturbances in coordination, extremity numbness, memory loss, difficulty walking and paralysis.   Endo/Heme:  Negative for anemia, swollen glands and bruises/bleeds easily.   Psychiatric/Behavioral:  Positive for depression. Negative for hallucinations, memory loss, decreased concentration, mood swings and panic attacks.    Breast:  Negative for breast discharge, breast mass, breast pain and nipple retraction.   Endocrine:  Negative for altered temperature regulation, polyphagia, polydipsia, unwanted hair growth and change in facial hair.      No flowsheet data found.  MERCEDES-7 SCORE 10/20/2020 9/3/2021 2/18/2022   Total Score - 6 (mild anxiety) 5 (mild anxiety)   Total Score 4 6 5         EXAM:  Blood pressure 110/77, pulse 109, height 1.626 m (5' 4\"), weight 87.5 kg (193 lb), last menstrual period 02/18/2022, not currently breastfeeding. Body mass index is 33.13 kg/m .  General - cooperative, well groomed, pleasant female in no acute distress.  Skin - no suspicious lesions or rashes  EENT-  PERRLA, euthyroid with out palpable nodules. Eyes appear midline and aligned. No drainage. Sclera is white. Conjunctiva is pink.   Neck - supple without lymphadenopathy.  Lungs - Chest rises equally and " symmetrical. No visible signs of nasal flaring or costal retractions. Clear to auscultation bilaterally.  Heart - regular rate and rhythm without murmur.  Abdomen - soft, nontender, nondistended, no masses or organomegaly noted. Bowel sounds active in 4 quadrants, CVA tenderness not noted.  Musculoskeletal - no gross deformities. Full range of motion.  Neurological - normal strength, sensation, and mental status.    Breast Exam:  Breast: Without visible skin changes. No dimpling or lesions seen.   Breasts supple, non-tender with palpation, no dominant mass, nodularity, or nipple discharge noted bilaterally. Axillary nodes negative.      Pelvic Exam: deferred, currently menstruating      ASSESSMENT:  Encounter Diagnosis   Name Primary?     Encounter for preventive health examination         PLAN:   -Due for next pap 10/2023  -Reviewed vulvar hygiene recommendations: Wear breathable cotton underwear, bathe and change into new clothes after working out. Avoid fragrant body washes, laundry detergent, body products and bubble bath. Avoid douching or inserting products vaginally.   -Suggested she try to increase the healthy bacteria by taking probiotics. Some options are Femdophilis or Florajen from a co-op. Probiotics with bifudus, acidophilis and other healthy bacteria (some studies recommend L rhamnosus GR-1, L reuteri, in addition to L. Acidophilis).  -Return after menses for wet prep if symptoms persist. May consider preventative boric acid treatment following menses or sexual activity  -Mental health: reviewed current treatment plan and offered support as needed.   Review birth control options and handout given.     Return to clinic in one year.  Follow-up as needed.        Answers for HPI/ROS submitted by the patient on 2/18/2022  MERCEDES 7 TOTAL SCORE: 5        I, Harvey MARI, RN WILI LEBRON Student, completed the PFSH and ROS. I then acted as a scribe for CNM for the remainder of the visit. - Harvey MARI, MARGAUX LEBRON  Student    I agree with the PFSH and ROS as completed by the WHNP, except for changes made by me. The remainder of the encounter was performed by me and scribed by the SNM. The scribed note accurately reflects my personal services and decisions made by me.    Kathryn Moore, ALFREDO, FRANCEM

## 2022-02-18 ENCOUNTER — OFFICE VISIT (OUTPATIENT)
Dept: OBGYN | Facility: CLINIC | Age: 22
End: 2022-02-18
Attending: ADVANCED PRACTICE MIDWIFE
Payer: COMMERCIAL

## 2022-02-18 VITALS
HEIGHT: 64 IN | SYSTOLIC BLOOD PRESSURE: 110 MMHG | HEART RATE: 109 BPM | WEIGHT: 193 LBS | DIASTOLIC BLOOD PRESSURE: 77 MMHG | BODY MASS INDEX: 32.95 KG/M2

## 2022-02-18 DIAGNOSIS — Z00.00 ENCOUNTER FOR PREVENTIVE HEALTH EXAMINATION: ICD-10-CM

## 2022-02-18 PROCEDURE — G0463 HOSPITAL OUTPT CLINIC VISIT: HCPCS

## 2022-02-18 PROCEDURE — 99395 PREV VISIT EST AGE 18-39: CPT | Performed by: ADVANCED PRACTICE MIDWIFE

## 2022-02-18 ASSESSMENT — ANXIETY QUESTIONNAIRES
7. FEELING AFRAID AS IF SOMETHING AWFUL MIGHT HAPPEN: NOT AT ALL
7. FEELING AFRAID AS IF SOMETHING AWFUL MIGHT HAPPEN: NOT AT ALL
6. BECOMING EASILY ANNOYED OR IRRITABLE: NOT AT ALL
1. FEELING NERVOUS, ANXIOUS, OR ON EDGE: SEVERAL DAYS
GAD7 TOTAL SCORE: 5
3. WORRYING TOO MUCH ABOUT DIFFERENT THINGS: SEVERAL DAYS
4. TROUBLE RELAXING: SEVERAL DAYS
GAD7 TOTAL SCORE: 5
2. NOT BEING ABLE TO STOP OR CONTROL WORRYING: SEVERAL DAYS
5. BEING SO RESTLESS THAT IT IS HARD TO SIT STILL: SEVERAL DAYS

## 2022-02-18 ASSESSMENT — ENCOUNTER SYMPTOMS
DISTURBANCES IN COORDINATION: 0
MUSCLE CRAMPS: 0
DECREASED LIBIDO: 0
FLANK PAIN: 0
SNORES LOUDLY: 0
CONSTIPATION: 0
EYE WATERING: 0
TROUBLE SWALLOWING: 0
BLOATING: 0
FATIGUE: 0
HOARSE VOICE: 0
SORE THROAT: 0
EXTREMITY NUMBNESS: 0
NAUSEA: 0
HEADACHES: 0
NERVOUS/ANXIOUS: 1
COUGH: 0
LEG SWELLING: 0
RECTAL BLEEDING: 0
DIFFICULTY URINATING: 0
BREAST MASS: 0
RESPIRATORY PAIN: 0
DIARRHEA: 0
TASTE DISTURBANCE: 0
COUGH DISTURBING SLEEP: 0
WEIGHT GAIN: 0
DYSURIA: 0
SINUS PAIN: 0
BACK PAIN: 0
ABDOMINAL PAIN: 0
DECREASED APPETITE: 0
RECTAL PAIN: 0
PALPITATIONS: 0
SINUS CONGESTION: 0
EYE IRRITATION: 0
ARTHRALGIAS: 0
TACHYCARDIA: 0
EYE PAIN: 0
NECK PAIN: 0
JAUNDICE: 0
TREMORS: 0
NUMBNESS: 0
DOUBLE VISION: 0
WEAKNESS: 0
POLYDIPSIA: 0
MYALGIAS: 0
INCREASED ENERGY: 0
NECK MASS: 0
MUSCLE WEAKNESS: 0
WHEEZING: 0
LIGHT-HEADEDNESS: 0
INSOMNIA: 0
POOR WOUND HEALING: 0
CHILLS: 0
HALLUCINATIONS: 0
PARALYSIS: 0
MEMORY LOSS: 0
PANIC: 0
BLOOD IN STOOL: 0
CLAUDICATION: 0
LEG PAIN: 0
SYNCOPE: 0
STIFFNESS: 0
ORTHOPNEA: 0
NIGHT SWEATS: 0
LOSS OF CONSCIOUSNESS: 0
TINGLING: 0
BOWEL INCONTINENCE: 0
HOT FLASHES: 0
DECREASED CONCENTRATION: 0
EXERCISE INTOLERANCE: 0
SPUTUM PRODUCTION: 0
EYE REDNESS: 0
VOMITING: 0
SEIZURES: 0
NAIL CHANGES: 0
DYSPNEA ON EXERTION: 0
BREAST PAIN: 0
DIZZINESS: 0
HYPERTENSION: 0
JOINT SWELLING: 0
HEARTBURN: 0
HEMOPTYSIS: 0
POSTURAL DYSPNEA: 0
HYPOTENSION: 0
FEVER: 0
SHORTNESS OF BREATH: 0
SKIN CHANGES: 0
POLYPHAGIA: 0
WEIGHT LOSS: 0
SLEEP DISTURBANCES DUE TO BREATHING: 0
BRUISES/BLEEDS EASILY: 0
SMELL DISTURBANCE: 0
DEPRESSION: 1
HEMATURIA: 0
ALTERED TEMPERATURE REGULATION: 0
SPEECH CHANGE: 0
SWOLLEN GLANDS: 0

## 2022-02-18 ASSESSMENT — PAIN SCALES - GENERAL: PAINLEVEL: NO PAIN (1)

## 2022-02-18 NOTE — LETTER
Date:February 21, 2022      Provider requested that no letter be sent. Do not send.       [facility]

## 2022-02-18 NOTE — LETTER
2022       RE: Elena Gunter  [address]  [id number]  [facility] [address]     Dear Colleague,    Thank you for referring your patient, Elena Gunter, to the [facility] [place] [facility]. Please see a copy of my visit note below.      Progress Note    SUBJECTIVE:  Elena Gunter is an 21 year old, , who requests an Annual Preventive Exam.     She is a former patient to the [facility].   LMP started 22 (today), 4-5 days, cycles are 28 days, regular. Moderate- heavy bleeding. Uses Diva cup during menstrual bleeding. Currently sexually active with 3 male partner within the past year. Declines STD screening today  Currently using condoms for birth control  Concerned about weight gain, mood changes with hormonal birth control options  Last screened for STDs  Negative GC/CT, no new partners since 2021    The patient reports that there is not domestic violence in her life.     Exercise: walking outside on campus  Stress reduction: therapy once a week    Elena has a history of depression and anxiety and is currently taking Lexapro 20mg daily. 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    Last pap 10/2020, due 10/2023    Flu vaccine December through school. Elena has received her COVID vaccine and booster    Concerns:  -Elena has noted abnormal vaginal discharge vaginal symptoms: watery, thin and malodorous. These symptoms developed two weeks ago. She was treated for yeast and BV 2021 and is concerned that one of these infections has returned. She shares that her stomach was upset with the oral Cleocin she took for the BV.    Shira is currently bleeding with her menses, declines pelvic exam or wet prep today      Menstrual History:  Menstrual History 2021   LAST MENSTRUAL  PERIOD 2021 -   Menarche Age - - 8   Period Cycle (Days) - - 28   Period Duration (Days) - - 4-5 days   Method of Contraception - - Condoms   Period Pattern - - Regular   Menstrual Flow - - Heavy   Menstrual Control - - -   Dysmenorrhea - - Severe   PMS Symptoms - - Cramping   Reviewed Today - - Yes       Last    Lab Results   Component Value Date    PAP NIL 10/20/2020     History of abnormal Pap smear: NO - age 21-29 PAP every 3 years recommended    Mammogram current: not applicable      Last Colonoscopy:  No results found for this or any previous visit.      HISTORY:  escitalopram (LEXAPRO) 20 MG tablet, Take 1 tablet (20 mg) by mouth daily    No current facility-administered medications on file prior to visit.    No Known Allergies  Immunization History   Administered Date(s) Administered     COVID-19,PF,Pfizer (12+ Yrs) 2021, 2021     Influenza Vaccine IM > 6 months Valent IIV4 (Alfuria,Fluzone) 10/14/2020       OB History    Para Term  AB Living   0 0 0 0 0 0   SAB IAB Ectopic Multiple Live Births   0 0 0 0 0     Past Medical History:   Diagnosis Date     Anxiety      Depression      Depressive disorder      Past Surgical History:   Procedure Laterality Date     wisdom teeth       Family History   Problem Relation Age of Onset     Anxiety Disorder Mother      Mental Illness Mother      Depression Mother      Anxiety Disorder Sister      Depression Sister      Social History     Socioeconomic History     Marital status: Single     Spouse name: Not on file     Number of children: Not on file     Years of education: Not on file     Highest education level: Not on file   Occupational History     Not on file   Tobacco Use     Smoking status: Never Smoker     Smokeless tobacco: Never Used   Substance and Sexual Activity     Alcohol use: Yes     Comment: Very little     Drug use: Never     Sexual activity: Yes     Partners: Male     Birth control/protection: None,  Condom   Other Topics Concern     Not on file   Social History Narrative     Not on file     Social Determinants of Health     Financial Resource Strain: Not on file   Food Insecurity: Not on file   Transportation Needs: Not on file   Physical Activity: Not on file   Stress: Not on file   Social Connections: Not on file   Intimate Partner Violence: Not on file   Housing Stability: Not on file       Review of Systems     Constitutional:  Negative for fever, chills, weight loss, weight gain, fatigue, decreased appetite, night sweats, recent stressors, height gain, height loss, post-operative complications, incisional pain, hallucinations, increased energy, hyperactivity and confused.   HENT:  Negative for ear pain, hearing loss, tinnitus, nosebleeds, trouble swallowing, hoarse voice, mouth sores, sore throat, ear discharge, tooth pain, gum tenderness, taste disturbance, smell disturbance, hearing aid, bleeding gums, dry mouth, sinus pain, sinus congestion and neck mass.    Eyes:  Negative for double vision, pain, redness, eye pain, decreased vision, eye watering, eye bulging, eye dryness, flashing lights, spots, floaters, strabismus, tunnel vision, jaundice and eye irritation.   Respiratory:   Negative for cough, hemoptysis, sputum production, shortness of breath, wheezing, sleep disturbances due to breathing, snores loudly, respiratory pain, dyspnea on exertion, cough disturbing sleep and postural dyspnea.    Cardiovascular:  Negative for chest pain, dyspnea on exertion, palpitations, orthopnea, claudication, leg swelling, fingers/toes turn blue, hypertension, hypotension, syncope, history of heart murmur, chest pain on exertion, chest pain at rest, pacemaker, few scattered varicosities, leg pain, sleep disturbances due to breathing, tachycardia, light-headedness, exercise intolerance and edema.   Gastrointestinal:  Negative for heartburn, nausea, vomiting, abdominal pain, diarrhea, constipation, blood in stool,  "melena, rectal pain, bloating, hemorrhoids, bowel incontinence, jaundice, rectal bleeding, coffee ground emesis and change in stool.   Genitourinary:  Positive for vaginal discharge. Negative for bladder incontinence, dysuria, urgency, hematuria, flank pain, difficulty urinating, genital sores, dyspareunia, decreased libido, nocturia, voiding less frequently, arousal difficulty, abnormal vaginal bleeding, excessive menstruation, menstrual changes, hot flashes, vaginal dryness and postmenopausal bleeding.   Musculoskeletal:  Negative for myalgias, back pain, joint swelling, arthralgias, stiffness, muscle cramps, neck pain, bone pain, muscle weakness and fracture.   Skin:  Negative for nail changes, itching, poor wound healing, rash, hair changes, skin changes, acne, warts, poor wound healing, scarring, flaky skin, Raynaud's phenomenon, sensitivity to sunlight and skin thickening.   Neurological:  Negative for dizziness, tingling, tremors, speech change, seizures, loss of consciousness, weakness, light-headedness, numbness, headaches, disturbances in coordination, extremity numbness, memory loss, difficulty walking and paralysis.   Endo/Heme:  Negative for anemia, swollen glands and bruises/bleeds easily.   Psychiatric/Behavioral:  Positive for depression. Negative for hallucinations, memory loss, decreased concentration, mood swings and panic attacks.    Breast:  Negative for breast discharge, breast mass, breast pain and nipple retraction.   Endocrine:  Negative for altered temperature regulation, polyphagia, polydipsia, unwanted hair growth and change in facial hair.      No flowsheet data found.  MERCEDES-7 SCORE 10/20/2020 9/3/2021 2/18/2022   Total Score - 6 (mild anxiety) 5 (mild anxiety)   Total Score 4 6 5         EXAM:  Blood pressure 110/77, pulse 109, height 1.626 m (5' 4\"), weight 87.5 kg (193 lb), last menstrual period 02/18/2022, not currently breastfeeding. Body mass index is 33.13 kg/m .  General - " cooperative, well groomed, pleasant female in no acute distress.  Skin - no suspicious lesions or rashes  EENT-  PERRLA, euthyroid with out palpable nodules. Eyes appear midline and aligned. No drainage. Sclera is white. Conjunctiva is pink.   Neck - supple without lymphadenopathy.  Lungs - Chest rises equally and symmetrical. No visible signs of nasal flaring or costal retractions. Clear to auscultation bilaterally.  Heart - regular rate and rhythm without murmur.  Abdomen - soft, nontender, nondistended, no masses or organomegaly noted. Bowel sounds active in 4 quadrants, CVA tenderness not noted.  Musculoskeletal - no gross deformities. Full range of motion.  Neurological - normal strength, sensation, and mental status.    Breast Exam:  Breast: Without visible skin changes. No dimpling or lesions seen.   Breasts supple, non-tender with palpation, no dominant mass, nodularity, or nipple discharge noted bilaterally. Axillary nodes negative.      Pelvic Exam: deferred, currently menstruating      ASSESSMENT:  Encounter Diagnosis   Name Primary?     Encounter for preventive health examination         PLAN:   -Due for next pap 10/2023  -Reviewed vulvar hygiene recommendations: Wear breathable cotton underwear, bathe and change into new clothes after working out. Avoid fragrant body washes, laundry detergent, body products and bubble bath. Avoid douching or inserting products vaginally.   -Suggested she try to increase the healthy bacteria by taking probiotics. Some options are Femdophilis or Florajen from a co-op. Probiotics with bifudus, acidophilis and other healthy bacteria (some studies recommend L rhamnosus GR-1, L reuteri, in addition to L. Acidophilis).  -Return after menses for wet prep if symptoms persist. May consider preventative boric acid treatment following menses or sexual activity  -Mental health: reviewed current treatment plan and offered support as needed.   Review birth control options and handout  given.     Return to clinic in one year.  Follow-up as needed.        Answers for HPI/ROS submitted by the patient on 2/18/2022  MERCEDES 7 TOTAL SCORE: 5        I, Harvey MARI, RN WILI NP Student, completed the PFSH and ROS. I then acted as a scribe for CNM for the remainder of the visit. - Harvey MARI, MARGAUX RASHEED NP Student    I agree with the PFSH and ROS as completed by the WHNP, except for changes made by me. The remainder of the encounter was performed by me and scribed by the SNM. The scribed note accurately reflects my personal services and decisions made by me.    ALFREDO Dockery CNM          Again, thank you for allowing me to participate in the care of your patient.      Sincerely,    Kathryn Moore CNM

## 2022-02-18 NOTE — PATIENT INSTRUCTIONS
Try to increase the healthy bacteria by taking probiotics. Some options are Femdophilis or Florajen from a co-op. Probiotics with bifudus, acidophilis and other healthy bacteria (some studies recommend L rhamnosus GR-1, L reuteri, in addition to L. Acidophilis).     Boric acid vaginal suppositories once a night for 7 nights can be used to treat a vaginal infection. Some people do one boric acid suppository after intercourse to prevent infections as well. You should be able to get those on-line, or you can get boric acid powder with size 00 gel caps and make your own. Make sure not to take any of this by mouth.

## 2022-02-19 ASSESSMENT — ANXIETY QUESTIONNAIRES: GAD7 TOTAL SCORE: 5

## 2022-03-31 ENCOUNTER — OFFICE VISIT (OUTPATIENT)
Dept: OBGYN | Facility: CLINIC | Age: 22
End: 2022-03-31
Attending: ADVANCED PRACTICE MIDWIFE
Payer: COMMERCIAL

## 2022-03-31 VITALS
DIASTOLIC BLOOD PRESSURE: 71 MMHG | SYSTOLIC BLOOD PRESSURE: 108 MMHG | WEIGHT: 193 LBS | BODY MASS INDEX: 32.95 KG/M2 | HEIGHT: 64 IN | HEART RATE: 87 BPM

## 2022-03-31 DIAGNOSIS — Z30.430 ENCOUNTER FOR IUD INSERTION: ICD-10-CM

## 2022-03-31 DIAGNOSIS — Z30.430 ENCOUNTER FOR INSERTION OF INTRAUTERINE CONTRACEPTIVE DEVICE: Primary | ICD-10-CM

## 2022-03-31 LAB
HCG UR QL: NEGATIVE
INTERNAL QC OK POCT: NORMAL
POCT KIT EXPIRATION DATE: NORMAL
POCT KIT LOT NUMBER: NORMAL

## 2022-03-31 PROCEDURE — G0463 HOSPITAL OUTPT CLINIC VISIT: HCPCS

## 2022-03-31 PROCEDURE — 999N000105 HC STATISTIC NO DOCUMENTATION TO SUPPORT CHARGE

## 2022-03-31 PROCEDURE — 58300 INSERT INTRAUTERINE DEVICE: CPT | Performed by: ADVANCED PRACTICE MIDWIFE

## 2022-03-31 PROCEDURE — 250N000011 HC RX IP 250 OP 636: Performed by: ADVANCED PRACTICE MIDWIFE

## 2022-03-31 PROCEDURE — 81025 URINE PREGNANCY TEST: CPT | Performed by: ADVANCED PRACTICE MIDWIFE

## 2022-03-31 RX ADMIN — LEVONORGESTREL 20 MCG: 52 INTRAUTERINE DEVICE INTRAUTERINE at 16:55

## 2022-03-31 NOTE — PROGRESS NOTES
"IUD Insertion:  CONSULT:    Is a pregnancy test required: Yes.  Was it positive or negative?  Negative  Was a consent obtained?  Yes    Subjective: Elena Gunter is a 21 year old  presents for IUD and desires Mirena type IUD.    Patient has been given the opportunity to ask questions about all forms of birth control, including all options appropriate for Elena Gunter. Discussed that no method of birth control, except abstinence is 100% effective against pregnancy or sexually transmitted infection.     Elena Gunter understands she may have the IUD removed at any time. IUD should be removed by a health care provider.    The entire insertion procedure was reviewed with the patient, including care after placement.    LMP one week ago. Last sexual activity: 1 + month ago. No allergy to betadine or shellfish. Recent STD screening  HCG Qual Urine   Date Value Ref Range Status   10/20/2020 Negative neg Final         /71 (BP Location: Left arm, Patient Position: Chair)   Pulse 87   Ht 1.626 m (5' 4\")   Wt 87.5 kg (193 lb)   BMI 33.13 kg/m      Pelvic Exam:   EG/BUS: normal genital architecture without lesions, erythema or abnormal secretions.   Vagina: moist, pink, rugae with physiologic discharge and secretions  Cervix: anterior position, nulliparous no lesions and pink, moist, closed, without lesion or CMT  Uterus: retroflexed position, mobile, no pain  Adnexa: within normal limits and no masses, nodularity, tenderness    PROCEDURE NOTE: Mirena IUD Insertion    Reason for Insertion: contraception    Elena took ibuprofen 2 hours before the procedure.   Under sterile technique, cervix was visualized with speculum and prepped with Betadine solution swab x 3. Tenaculum was placed for stability. The uterus was gently straightened and sounded to 7.0 cm. IUD prepared for placement, and IUD inserted according to 's instructions without difficulty or significant resitance, and deployed at the " fundus. The strings were visualized and trimmed to 3 cm from the external os. Tenaculum was removed and hemostasis noted. Speculum removed.  Patient tolerated procedure well.    Lot # [id number]  Exp: 06/2024    EBL: minimal    Complications: none    ASSESSMENT:     ICD-10-CM    1. Encounter for IUD insertion  Z30.430         PLAN:    Given 's handouts, including when to have IUD removed, list of danger s/sx, side effects and follow up recommended. Encouraged condom use for prevention of STD. Back up contraception advised for 7 days if progestin method. Advised to call for any fever, for prolonged or severe pain or bleeding, abnormal vaginal discharge, or unable to palpate strings. She was advised to use pain medications (ibuprofen) as needed for mild to moderate pain. Advised to follow-up in clinic in 4-6 weeks for IUD string check if unable to find strings or as directed by provider.     I, Elizabeth Patino, am serving as a scribe; to document services personally performed by  Zita Dee CNM based on data collection and the provider's statements to me.     I agree with the PFSH and ROS as completed by Elizabeth Patino DNP student except for changes made by me. The remainder of the encounter was performed by me and scribed by Elizabeth Patino DNP student. The scribed note accurately reflects my personal services and decisions made by me.   ALFREDO Beverly CNM, APRN CNM

## 2022-03-31 NOTE — LETTER
"3/31/2022       RE: Elena Gunter  [address]  [address]  [facility] [address]     Dear Colleague,    Thank you for referring your patient, Elena Gunter, to the [facility] [place] [facility]. Please see a copy of my visit note below.    IUD Insertion:  CONSULT:    Is a pregnancy test required: Yes.  Was it positive or negative?  Negative  Was a consent obtained?  Yes    Subjective: Elena Gunter is a 21 year old  presents for IUD and desires Mirena type IUD.    Patient has been given the opportunity to ask questions about all forms of birth control, including all options appropriate for Elena Gunter. Discussed that no method of birth control, except abstinence is 100% effective against pregnancy or sexually transmitted infection.     Elena Gunter understands she may have the IUD removed at any time. IUD should be removed by a health care provider.    The entire insertion procedure was reviewed with the patient, including care after placement.    LMP one week ago. Last sexual activity: 1 + month ago. No allergy to betadine or shellfish. Recent STD screening  HCG Qual Urine   Date Value Ref Range Status   10/20/2020 Negative neg Final         /71 (BP Location: Left arm, Patient Position: Chair)   Pulse 87   Ht 1.626 m (5' 4\")   Wt 87.5 kg (193 lb)   BMI 33.13 kg/m      Pelvic Exam:   EG/BUS: normal genital architecture without lesions, erythema or abnormal secretions.   Vagina: moist, pink, rugae with physiologic discharge and secretions  Cervix: anterior position, nulliparous no lesions and pink, moist, closed, without lesion or CMT  Uterus: retroflexed position, mobile, no pain  Adnexa: within normal limits and no masses, nodularity, tenderness    PROCEDURE NOTE: Mirena IUD Insertion    Reason for Insertion: contraception    Elena took ibuprofen 2 hours before the procedure.   Under sterile technique, " cervix was visualized with speculum and prepped with Betadine solution swab x 3. Tenaculum was placed for stability. The uterus was gently straightened and sounded to 7.0 cm. IUD prepared for placement, and IUD inserted according to 's instructions without difficulty or significant resitance, and deployed at the fundus. The strings were visualized and trimmed to 3 cm from the external os. Tenaculum was removed and hemostasis noted. Speculum removed.  Patient tolerated procedure well.    Lot # [id number]  Exp: 06/2024    EBL: minimal    Complications: none    ASSESSMENT:     ICD-10-CM    1. Encounter for IUD insertion  Z30.430         PLAN:    Given 's handouts, including when to have IUD removed, list of danger s/sx, side effects and follow up recommended. Encouraged condom use for prevention of STD. Back up contraception advised for 7 days if progestin method. Advised to call for any fever, for prolonged or severe pain or bleeding, abnormal vaginal discharge, or unable to palpate strings. She was advised to use pain medications (ibuprofen) as needed for mild to moderate pain. Advised to follow-up in clinic in 4-6 weeks for IUD string check if unable to find strings or as directed by provider.     I, Elizabeth Patino, am serving as a scribe; to document services personally performed by  Zita Dee CNM based on data collection and the provider's statements to me.     I agree with the PFSH and ROS as completed by Elizabeth Patino DNP student except for changes made by me. The remainder of the encounter was performed by me and scribed by Elizabeth Patino DNP student. The scribed note accurately reflects my personal services and decisions made by me.   ALFREDO Beverly CNM, APRN CNM        Again, thank you for allowing me to participate in the care of your patient.      Sincerely,    ALFREDO Beverly CNM

## 2022-03-31 NOTE — LETTER
Date:April 3, 2022      Provider requested that no letter be sent. Do not send.       [facility]

## 2022-04-27 ENCOUNTER — OFFICE VISIT (OUTPATIENT)
Dept: URGENT CARE | Facility: URGENT CARE | Age: 22
End: 2022-04-27
Payer: COMMERCIAL

## 2022-04-27 VITALS
DIASTOLIC BLOOD PRESSURE: 77 MMHG | TEMPERATURE: 98.3 F | RESPIRATION RATE: 18 BRPM | HEART RATE: 83 BPM | HEIGHT: 64 IN | WEIGHT: 185 LBS | SYSTOLIC BLOOD PRESSURE: 123 MMHG | BODY MASS INDEX: 31.58 KG/M2 | OXYGEN SATURATION: 96 %

## 2022-04-27 DIAGNOSIS — J06.9 VIRAL URI: Primary | ICD-10-CM

## 2022-04-27 DIAGNOSIS — J02.9 SORE THROAT: ICD-10-CM

## 2022-04-27 LAB — DEPRECATED S PYO AG THROAT QL EIA: NEGATIVE

## 2022-04-27 PROCEDURE — 87651 STREP A DNA AMP PROBE: CPT | Performed by: PHYSICIAN ASSISTANT

## 2022-04-27 PROCEDURE — 99213 OFFICE O/P EST LOW 20 MIN: CPT | Performed by: PHYSICIAN ASSISTANT

## 2022-04-27 NOTE — LETTER
April 27, 2022      Elena Gunter  [address]  [address]  [facility] [place] [address]        To Whom It May Concern,      Patient was presented at [facility] today feeling sick. Patient is unable to return to work 1 to 3 days, please call with any question.          Sincerely,        Lyndsay Lainez PA-C

## 2022-04-28 LAB — GROUP A STREP BY PCR: NOT DETECTED

## 2022-04-28 NOTE — PROGRESS NOTES
"  Assessment & Plan:        ICD-10-CM    1. Viral URI  J06.9    2. Sore throat  J02.9 Streptococcus A Rapid Screen w/Reflex to PCR - Clinic Collect     Group A Streptococcus PCR Throat Swab         Plan/Clinical Decision Making:    Negative strep test.   Cold like symptoms since Saturday.   Rest, fluids.   Tylenol and/or ibuprofen as needed.         At the end of the encounter, I discussed results, diagnosis, medications. Discussed red flags for immediate return to clinic/ER, as well as indications for follow up if no improvement. Patient understood and agreed to plan. Patient was stable for discharge.        Lyndsay Lainez PA-C on 4/27/2022 at 8:02 PM          Subjective:     HPI:    Elena is a 21 year old female who presents to clinic today for the following health issues:  Chief Complaint   Patient presents with     Urgent Care     Sick     Covid Home neg. Partner positive for strep. Ear clog, weakness. Worsen today.     Cough     Congestion since Sunday.     HPI    ST since Saturday. Also nasal congestion and cough.   Lymph nodes swollen, feeling tired.   No fever.   Negative Covid test at home and has had PCR test yesterday.   Hasn't had covid before.     History obtained from the patient.    Review of Systems  NO GI symptoms.      Patient Active Problem List   Diagnosis     Encounter for preventive health examination     Encounter for IUD insertion        Past Medical History:   Diagnosis Date     Anxiety      Depression      Depressive disorder July, 2020       Social History     Tobacco Use     Smoking status: Never Smoker     Smokeless tobacco: Never Used   Substance Use Topics     Alcohol use: Yes     Comment: Very little, 1-2 per sitting             Objective:     Vitals:    04/27/22 1914   BP: 123/77   Pulse: 83   Resp: 18   Temp: 98.3  F (36.8  C)   TempSrc: Temporal   SpO2: 96%   Weight: 83.9 kg (185 lb)   Height: 1.626 m (5' 4\")         Physical Exam   EXAM:   Pleasant, alert, appropriate " appearance. NAD.  Head Exam: Normocephalic, atraumatic.  Eye Exam:   non icteric/injection.    Ear Exam: TMs grey without bulging. Normal canals.  Normal pinna.  Nose Exam: Normal external nose.    OroPharynx Exam:  Moist mucous membranes. No erythema, pharynx without exudate or hypertrophy.  Neck/Thyroid Exam:  Mild neck adenopathy  Chest/Respiratory Exam: CTAB.  Cardiovascular Exam: RRR. No murmur or rubs.        Results:  Results for orders placed or performed in visit on 04/27/22   Streptococcus A Rapid Screen w/Reflex to PCR - Clinic Collect     Status: Normal    Specimen: Throat; Swab   Result Value Ref Range    Group A Strep antigen Negative Negative

## 2022-05-16 ENCOUNTER — TELEPHONE (OUTPATIENT)
Dept: OBGYN | Facility: CLINIC | Age: 22
End: 2022-05-16
Payer: COMMERCIAL

## 2022-05-16 DIAGNOSIS — F41.1 GENERALIZED ANXIETY DISORDER: ICD-10-CM

## 2022-05-16 RX ORDER — ESCITALOPRAM OXALATE 20 MG/1
20 TABLET ORAL DAILY
Qty: 90 TABLET | Refills: 2 | Status: SHIPPED | OUTPATIENT
Start: 2022-05-16

## 2022-05-16 NOTE — TELEPHONE ENCOUNTER
[facility] Call Center    Phone Message    May a detailed message be left on voicemail: yes     Reason for Call: Medication Question or concern regarding medication   Prescription Clarification  Name of Medication: escitalopram (LEXAPRO) 20 MG tablet  Prescribing Provider: Chucho   Pharmacy: [id number] - [place], [place] - [address] AT [facility] [address]   What on the order needs clarification? Patient is calling because pharmacy states they are waiting for approval from provider to fill this medication refill.  Patient asks for Syl Rankin to send in approval ASAP.  Thank you.          Action Taken: Other: [facility]    Travel Screening: Not Applicable

## 2022-05-16 NOTE — TELEPHONE ENCOUNTER
Last refilled 2/15/22. Patient had annual exam 2/18/22 and lexapro was discussed. Patient to RTC in 1 year. Refill sent to last until due for annual. Left  notifying patient.

## 2022-09-24 ENCOUNTER — HEALTH MAINTENANCE LETTER (OUTPATIENT)
Age: 22
End: 2022-09-24

## 2023-05-08 ENCOUNTER — HEALTH MAINTENANCE LETTER (OUTPATIENT)
Age: 23
End: 2023-05-08

## 2024-07-14 ENCOUNTER — HEALTH MAINTENANCE LETTER (OUTPATIENT)
Age: 24
End: 2024-07-14